# Patient Record
Sex: MALE | Race: WHITE
[De-identification: names, ages, dates, MRNs, and addresses within clinical notes are randomized per-mention and may not be internally consistent; named-entity substitution may affect disease eponyms.]

---

## 2019-02-11 ENCOUNTER — HOSPITAL ENCOUNTER (EMERGENCY)
Dept: HOSPITAL 47 - EC | Age: 34
Discharge: HOME | End: 2019-02-11
Payer: COMMERCIAL

## 2019-02-11 VITALS
TEMPERATURE: 98.4 F | DIASTOLIC BLOOD PRESSURE: 84 MMHG | RESPIRATION RATE: 18 BRPM | HEART RATE: 73 BPM | SYSTOLIC BLOOD PRESSURE: 127 MMHG

## 2019-02-11 DIAGNOSIS — M79.604: ICD-10-CM

## 2019-02-11 DIAGNOSIS — R20.2: ICD-10-CM

## 2019-02-11 DIAGNOSIS — Z96.698: ICD-10-CM

## 2019-02-11 DIAGNOSIS — R20.0: ICD-10-CM

## 2019-02-11 DIAGNOSIS — Z87.81: ICD-10-CM

## 2019-02-11 DIAGNOSIS — F17.200: ICD-10-CM

## 2019-02-11 DIAGNOSIS — G89.18: Primary | ICD-10-CM

## 2019-02-11 DIAGNOSIS — Z79.899: ICD-10-CM

## 2019-02-11 DIAGNOSIS — Z79.82: ICD-10-CM

## 2019-02-11 DIAGNOSIS — M79.89: ICD-10-CM

## 2019-02-11 PROCEDURE — 99284 EMERGENCY DEPT VISIT MOD MDM: CPT
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
## 2019-02-11 NOTE — ED
Extremity Problem HPI





- General


Chief complaint: Extremity Problem,Nontraumatic


Stated complaint: R Leg Pain, Numbness


Time Seen by Provider: 02/11/19 19:02


Source: patient


Mode of arrival: EMS


Limitations: no limitations





- History of Present Illness


Initial comments: 


33-year-old male patient who recently underwent external fixation of a right 

ankle injury on 02/05/2019.  Patient states that he is out of his pain 

medication as been having increasing pain to the right leg.  Patient states he 

is also having a numbness tingling sensation in the right lateral thigh that is 

worsened since yesterday.  States his leg is swollen.  States his surgeon is 

concern for blood clot.  States he is taking an aspirin daily.  Denies any 

numbness or tingling to the foot.  Denies any new injury to the leg.  Denies 

any chest pain, shortness of breath, or palpitations.  Denies any history of 

DVT. Patient denies any recent rash, fever, chills, abdominal pain, nausea, 

vomiting, diarrhea, constipation, back pain, numbness, tingling, dizziness, 

weakness, hematuria, dysuria, urinary urgency, urinary frequency, headache, 

visual changes, or any other complaints.








- Related Data


 Home Medications











 Medication  Instructions  Recorded  Confirmed


 


Aspirin EC [Ecotrin Low Dose] 81 mg PO DAILY 02/11/19 02/11/19


 


Docusate [Colace] 100 mg PO DAILY 02/11/19 02/11/19


 


HYDROcodone/APAP 5-325MG [Norco 1 tab PO Q6HR PRN 02/11/19 02/11/19





5-325]   


 


Omeprazole 40 mg PO DAILY 02/11/19 02/11/19











 Allergies











Allergy/AdvReac Type Severity Reaction Status Date / Time


 


No Known Allergies Allergy   Unverified 02/11/19 19:21














Review of Systems


ROS Statement: 


Those systems with pertinent positive or pertinent negative responses have been 

documented in the HPI.





ROS Other: All systems not noted in ROS Statement are negative.





Past Medical History


Additional Past Medical History / Comment(s): suicide attempts in past


History of Any Multi-Drug Resistant Organisms: None Reported


Past Surgical History: Orthopedic Surgery


Additional Past Surgical History / Comment(s): r ankle surg


Past Psychological History: Anxiety, Bipolar, Depression


Smoking Status: Current every day smoker


Past Alcohol Use History: Abuse, Daily


Past Drug Use History: Marijuana





General Exam


Limitations: no limitations


General appearance: alert, in no apparent distress, other (Physical well-

developed, well-nourished adult male patient in no acute distress.  Vital signs 

upon presentation are temperature 98.4F, pulse 73, respirations 18, blood 

pressure 127/84, pulse ox 99% on room air.)


Eye exam: Present: normal appearance, PERRL, EOMI.  Absent: scleral icterus, 

conjunctival injection, periorbital swelling


ENT exam: Present: normal exam, normal oropharynx, mucous membranes moist


Respiratory exam: Present: normal lung sounds bilaterally.  Absent: respiratory 

distress, wheezes, rales, rhonchi, stridor


Cardiovascular Exam: Present: regular rate, normal rhythm, normal heart sounds.

  Absent: systolic murmur, diastolic murmur, rubs, gallop, clicks


Extremities exam: Present: full ROM, normal capillary refill, other (Patient 

has external fixation device to the right lower leg and ankle.  Patient 

insertion sites appear to be intact with no evidence of infection.  There is 

mild swelling of the right leg, nonpitting.  Skin is pink, warm, dry.  Cap 

refills less than 3 seconds.  Pedal pulses 2+.).  Absent: normal inspection, 

tenderness, pedal edema, joint swelling, calf tenderness


Neurological exam: Present: alert, oriented X3, CN II-XII intact


Psychiatric exam: Present: normal affect, normal mood


Skin exam: Present: warm, dry, intact, normal color.  Absent: rash





Course


 Vital Signs











  02/11/19





  18:58


 


Temperature 98.4 F


 


Pulse Rate 73


 


Respiratory 18





Rate 


 


Blood Pressure 127/84


 


O2 Sat by Pulse 99





Oximetry 














Medical Decision Making





- Medical Decision Making


33-year-old male patient presents to the emergency department today for 

evaluation of pain, swelling, and paresthesia to the right lower extremity.  

Patient is status post external fixation and repair of right ankle fracture on 

02/05/2019.  Patient's physicians abdomen to rule out DVT.  Ultrasound was 

obtained and showed no evidence for acute DVT.  Patient is neurovascular status 

was intact.  Pulses were strong and intact.  This all patient's pain is more 

related to being out of his narcotic pain medication as he did take more than 

directed over the last several days.  He is instructed to follow-up with his 

surgeon for further evaluation any further prescription refills.  Return 

parameters were discussed in detail.  He verbalizes understanding and agrees 

with this plan.








- Radiology Data


Radiology results: report reviewed, image reviewed


Venous Doppler duplex of the right lower extremity was obtained.  Report was 

reviewed in its entirety.  Impression by Dr. Wild shows no ultrasound evidence 

for acute DVT in the right lower extremity.





Disposition


Clinical Impression: 


 Postoperative pain, Right leg paresthesias





Disposition: HOME SELF-CARE


Condition: Good


Instructions (If sedation given, give patient instructions):  Paresthesia (ED), 

Leg Pain (ED)


Additional Instructions: 


Follow-up with your surgeon for further evaluation and any refills on 

prescriptions.  Return to the emergency department for any new, worsening, or 

concerning symptoms.


Is patient prescribed a controlled substance at d/c from ED?: No


Referrals: 


None,Stated [Primary Care Provider] - 1-2 days


Time of Disposition: 20:46

## 2019-02-16 ENCOUNTER — HOSPITAL ENCOUNTER (EMERGENCY)
Dept: HOSPITAL 47 - EC | Age: 34
Discharge: HOME | End: 2019-02-16
Payer: COMMERCIAL

## 2019-02-16 VITALS — SYSTOLIC BLOOD PRESSURE: 135 MMHG | TEMPERATURE: 98.5 F | DIASTOLIC BLOOD PRESSURE: 72 MMHG | HEART RATE: 99 BPM

## 2019-02-16 VITALS — RESPIRATION RATE: 18 BRPM

## 2019-02-16 DIAGNOSIS — Z79.899: ICD-10-CM

## 2019-02-16 DIAGNOSIS — Z53.8: ICD-10-CM

## 2019-02-16 DIAGNOSIS — S82.891A: Primary | ICD-10-CM

## 2019-02-16 DIAGNOSIS — F17.200: ICD-10-CM
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
## 2019-02-16 DIAGNOSIS — G89.18: ICD-10-CM

## 2019-02-16 DIAGNOSIS — Z79.82: ICD-10-CM

## 2019-02-16 DIAGNOSIS — Z98.890: ICD-10-CM

## 2019-02-16 PROCEDURE — 73590 X-RAY EXAM OF LOWER LEG: CPT

## 2019-02-16 PROCEDURE — 99283 EMERGENCY DEPT VISIT LOW MDM: CPT

## 2019-02-16 PROCEDURE — 96372 THER/PROPH/DIAG INJ SC/IM: CPT

## 2019-02-16 NOTE — XR
EXAMINATION TYPE: XR tibia fibula RT

 

DATE OF EXAM: 2/16/2019

 

CLINICAL HISTORY: pain

 

TECHNIQUE:  AP and lateral images of the right tibia and fibula are obtained.

 

COMPARISON: None.

 

FINDINGS: Comminuted distal tibial fracture. Comminuted ulnar fracture noted as well. Traction device
 in place. The joint spaces  appear within normal limits. Soft tissue edema noted. The graft IMPRESSI
ON:  

 

Distal tibial and fibular fractures with traction device in place.
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
## 2019-02-17 ENCOUNTER — HOSPITAL ENCOUNTER (EMERGENCY)
Dept: HOSPITAL 47 - EC | Age: 34
Discharge: HOME | End: 2019-02-17
Payer: COMMERCIAL

## 2019-02-17 VITALS
DIASTOLIC BLOOD PRESSURE: 80 MMHG | SYSTOLIC BLOOD PRESSURE: 128 MMHG | RESPIRATION RATE: 18 BRPM | HEART RATE: 83 BPM | TEMPERATURE: 97.7 F

## 2019-02-17 DIAGNOSIS — S82.831A: ICD-10-CM

## 2019-02-17 DIAGNOSIS — R00.0: ICD-10-CM

## 2019-02-17 DIAGNOSIS — R06.82: ICD-10-CM

## 2019-02-17 DIAGNOSIS — Z79.899: ICD-10-CM

## 2019-02-17 DIAGNOSIS — Z59.0: ICD-10-CM

## 2019-02-17 DIAGNOSIS — F17.200: ICD-10-CM

## 2019-02-17 DIAGNOSIS — G89.18: ICD-10-CM

## 2019-02-17 DIAGNOSIS — S82.391A: Primary | ICD-10-CM

## 2019-02-17 DIAGNOSIS — F11.20: ICD-10-CM

## 2019-02-17 DIAGNOSIS — Z91.14: ICD-10-CM

## 2019-02-17 DIAGNOSIS — Z79.82: ICD-10-CM

## 2019-02-17 DIAGNOSIS — R50.9: ICD-10-CM

## 2019-02-17 DIAGNOSIS — R45.851: ICD-10-CM
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
## 2019-02-17 LAB
ALBUMIN SERPL-MCNC: 3.4 G/DL (ref 3.5–5)
ALP SERPL-CCNC: 77 U/L (ref 38–126)
ALT SERPL-CCNC: 33 U/L (ref 21–72)
ANION GAP SERPL CALC-SCNC: 5 MMOL/L
APTT BLD: 22.6 SEC (ref 22–30)
AST SERPL-CCNC: 29 U/L (ref 17–59)
BASOPHILS # BLD AUTO: 0.1 K/UL (ref 0–0.2)
BASOPHILS NFR BLD AUTO: 0 %
BUN SERPL-SCNC: 15 MG/DL (ref 9–20)
CALCIUM SPEC-MCNC: 8.8 MG/DL (ref 8.4–10.2)
CHLORIDE SERPL-SCNC: 109 MMOL/L (ref 98–107)
CO2 SERPL-SCNC: 22 MMOL/L (ref 22–30)
EOSINOPHIL # BLD AUTO: 0.3 K/UL (ref 0–0.7)
EOSINOPHIL NFR BLD AUTO: 2 %
ERYTHROCYTE [DISTWIDTH] IN BLOOD BY AUTOMATED COUNT: 3.82 M/UL (ref 4.3–5.9)
ERYTHROCYTE [DISTWIDTH] IN BLOOD: 14.6 % (ref 11.5–15.5)
GLUCOSE SERPL-MCNC: 108 MG/DL (ref 74–99)
HCT VFR BLD AUTO: 34.2 % (ref 39–53)
HGB BLD-MCNC: 11.9 GM/DL (ref 13–17.5)
INR PPP: 1 (ref ?–1.2)
LYMPHOCYTES # SPEC AUTO: 1.1 K/UL (ref 1–4.8)
LYMPHOCYTES NFR SPEC AUTO: 8 %
MCH RBC QN AUTO: 31.1 PG (ref 25–35)
MCHC RBC AUTO-ENTMCNC: 34.6 G/DL (ref 31–37)
MCV RBC AUTO: 89.7 FL (ref 80–100)
MONOCYTES # BLD AUTO: 0.9 K/UL (ref 0–1)
MONOCYTES NFR BLD AUTO: 7 %
NEUTROPHILS # BLD AUTO: 10.8 K/UL (ref 1.3–7.7)
NEUTROPHILS NFR BLD AUTO: 81 %
PH UR: 5.5 [PH] (ref 5–8)
PLATELET # BLD AUTO: 364 K/UL (ref 150–450)
POTASSIUM SERPL-SCNC: 4 MMOL/L (ref 3.5–5.1)
PROT SERPL-MCNC: 6.2 G/DL (ref 6.3–8.2)
PT BLD: 10.4 SEC (ref 9–12)
SODIUM SERPL-SCNC: 136 MMOL/L (ref 137–145)
SP GR UR: 1.01 (ref 1–1.03)
UROBILINOGEN UR QL STRIP: <2 MG/DL (ref ?–2)
WBC # BLD AUTO: 13.3 K/UL (ref 3.8–10.6)

## 2019-02-17 PROCEDURE — 96375 TX/PRO/DX INJ NEW DRUG ADDON: CPT

## 2019-02-17 PROCEDURE — 85730 THROMBOPLASTIN TIME PARTIAL: CPT

## 2019-02-17 PROCEDURE — 99284 EMERGENCY DEPT VISIT MOD MDM: CPT

## 2019-02-17 PROCEDURE — 93005 ELECTROCARDIOGRAM TRACING: CPT

## 2019-02-17 PROCEDURE — 80053 COMPREHEN METABOLIC PANEL: CPT

## 2019-02-17 PROCEDURE — 36415 COLL VENOUS BLD VENIPUNCTURE: CPT

## 2019-02-17 PROCEDURE — 71046 X-RAY EXAM CHEST 2 VIEWS: CPT

## 2019-02-17 PROCEDURE — 96365 THER/PROPH/DIAG IV INF INIT: CPT

## 2019-02-17 PROCEDURE — 96361 HYDRATE IV INFUSION ADD-ON: CPT

## 2019-02-17 PROCEDURE — 73590 X-RAY EXAM OF LOWER LEG: CPT

## 2019-02-17 PROCEDURE — 85025 COMPLETE CBC W/AUTO DIFF WBC: CPT

## 2019-02-17 PROCEDURE — 87040 BLOOD CULTURE FOR BACTERIA: CPT

## 2019-02-17 PROCEDURE — 80306 DRUG TEST PRSMV INSTRMNT: CPT

## 2019-02-17 PROCEDURE — 83605 ASSAY OF LACTIC ACID: CPT

## 2019-02-17 PROCEDURE — 81003 URINALYSIS AUTO W/O SCOPE: CPT

## 2019-02-17 PROCEDURE — 86140 C-REACTIVE PROTEIN: CPT

## 2019-02-17 PROCEDURE — 87502 INFLUENZA DNA AMP PROBE: CPT

## 2019-02-17 PROCEDURE — 85610 PROTHROMBIN TIME: CPT

## 2019-02-17 RX ADMIN — NICARDIPINE HYDROCHLORIDE SCH MLS/HR: 2.5 INJECTION INTRAVENOUS at 07:23

## 2019-02-17 RX ADMIN — NICARDIPINE HYDROCHLORIDE SCH MLS/HR: 2.5 INJECTION INTRAVENOUS at 05:04

## 2019-02-17 SDOH — ECONOMIC STABILITY - HOUSING INSECURITY: HOMELESSNESS: Z59.0
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
## 2019-02-17 NOTE — XR
EXAM:

  XR Right Tibia and Fibula, 2 Views

 

CLINICAL HISTORY:

  ITS.REASON XR Reason: Pain

 

TECHNIQUE:

  Frontal and lateral views of the right tibia and fibula.

 

COMPARISON:

  Right tibia fibula radiographs 2/16/2019.

 

FINDINGS:

  Bones/joints:  Comminuted intra-articular distal tibia and fibula 

fractures are seen status post external fixation of the right lower leg.  

No significant interval change in alignment of fractures.

  Soft tissues: Soft tissue swelling of the ankle..  No radiopaque 

foreign body.

 

IMPRESSION:     

  Comminuted intra-articular distal tibia and fibula fractures are seen 

status post external fixation of the right lower leg.  No new fracture is 

seen.

## 2019-02-19 ENCOUNTER — HOSPITAL ENCOUNTER (EMERGENCY)
Dept: HOSPITAL 47 - EC | Age: 34
Discharge: HOME | End: 2019-02-19
Payer: COMMERCIAL

## 2019-02-19 VITALS
DIASTOLIC BLOOD PRESSURE: 60 MMHG | HEART RATE: 102 BPM | TEMPERATURE: 101.9 F | RESPIRATION RATE: 18 BRPM | SYSTOLIC BLOOD PRESSURE: 132 MMHG

## 2019-02-19 DIAGNOSIS — R50.9: ICD-10-CM

## 2019-02-19 DIAGNOSIS — F17.200: ICD-10-CM

## 2019-02-19 DIAGNOSIS — Z00.00: Primary | ICD-10-CM

## 2019-02-19 LAB
ANION GAP SERPL CALC-SCNC: 8 MMOL/L
BASOPHILS # BLD AUTO: 0.1 K/UL (ref 0–0.2)
BASOPHILS NFR BLD AUTO: 1 %
BUN SERPL-SCNC: 8 MG/DL (ref 9–20)
CALCIUM SPEC-MCNC: 9 MG/DL (ref 8.4–10.2)
CHLORIDE SERPL-SCNC: 108 MMOL/L (ref 98–107)
CO2 SERPL-SCNC: 25 MMOL/L (ref 22–30)
EOSINOPHIL # BLD AUTO: 0.2 K/UL (ref 0–0.7)
EOSINOPHIL NFR BLD AUTO: 3 %
ERYTHROCYTE [DISTWIDTH] IN BLOOD BY AUTOMATED COUNT: 4.03 M/UL (ref 4.3–5.9)
ERYTHROCYTE [DISTWIDTH] IN BLOOD: 14.4 % (ref 11.5–15.5)
GLUCOSE SERPL-MCNC: 88 MG/DL (ref 74–99)
HCT VFR BLD AUTO: 36.6 % (ref 39–53)
HGB BLD-MCNC: 12.4 GM/DL (ref 13–17.5)
LYMPHOCYTES # SPEC AUTO: 0.7 K/UL (ref 1–4.8)
LYMPHOCYTES NFR SPEC AUTO: 9 %
MCH RBC QN AUTO: 30.8 PG (ref 25–35)
MCHC RBC AUTO-ENTMCNC: 33.9 G/DL (ref 31–37)
MCV RBC AUTO: 90.7 FL (ref 80–100)
MONOCYTES # BLD AUTO: 0.6 K/UL (ref 0–1)
MONOCYTES NFR BLD AUTO: 7 %
NEUTROPHILS # BLD AUTO: 6.1 K/UL (ref 1.3–7.7)
NEUTROPHILS NFR BLD AUTO: 78 %
PLATELET # BLD AUTO: 425 K/UL (ref 150–450)
POTASSIUM SERPL-SCNC: 4.3 MMOL/L (ref 3.5–5.1)
SODIUM SERPL-SCNC: 141 MMOL/L (ref 137–145)
WBC # BLD AUTO: 7.8 K/UL (ref 3.8–10.6)

## 2019-02-19 PROCEDURE — 85025 COMPLETE CBC W/AUTO DIFF WBC: CPT

## 2019-02-19 PROCEDURE — 80048 BASIC METABOLIC PNL TOTAL CA: CPT

## 2019-02-19 PROCEDURE — 36415 COLL VENOUS BLD VENIPUNCTURE: CPT

## 2019-02-19 PROCEDURE — 99283 EMERGENCY DEPT VISIT LOW MDM: CPT
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
## 2019-02-19 NOTE — XR
PROCEDURE: XR tibia fibula RT - 3V

DATE AND TIME: 2/19/2019 6:27 PM

 

CLINICAL INDICATION: PHH; Pain, increased pain and redness

 

TECHNIQUE: AP, oblique AP, and lateral views

 

COMPARISON: 2/17/2019

 

FINDINGS: The comminuted tibial plafond intra-articular fracture is noted, as is the calcaneal fractu
re. 

 

No other fractures. 

 

The soft tissues are unremarkable.

 

IMPRESSION:

No definite interval change.

## 2019-02-19 NOTE — XR
PROCEDURE: XR foot complete RT - 3V

DATE AND TIME: 2/19/2019 6:27 PM

 

CLINICAL INDICATION: PHH; Pain

 

TECHNIQUE: Crosstable lateral and AP and oblique AP views

 

COMPARISON: 2/17/2019

 

FINDINGS: External fixator in place. The comminuted tibial plafond intra-articular fracture is noted,
 as is the calcaneal fracture. 

 

There is no midfoot or forefoot fracture or malalignment. 

 

IMPRESSION:

Stable appearance.

## 2019-03-19 ENCOUNTER — HOSPITAL ENCOUNTER (EMERGENCY)
Dept: HOSPITAL 47 - EC | Age: 34
LOS: 1 days | Discharge: HOME | End: 2019-03-20
Payer: COMMERCIAL

## 2019-03-19 VITALS
RESPIRATION RATE: 18 BRPM | DIASTOLIC BLOOD PRESSURE: 99 MMHG | TEMPERATURE: 98.1 F | SYSTOLIC BLOOD PRESSURE: 147 MMHG | HEART RATE: 99 BPM

## 2019-03-19 DIAGNOSIS — F17.200: ICD-10-CM

## 2019-03-19 DIAGNOSIS — Z98.890: ICD-10-CM

## 2019-03-19 DIAGNOSIS — M96.89: Primary | ICD-10-CM

## 2019-03-19 PROCEDURE — 99282 EMERGENCY DEPT VISIT SF MDM: CPT

## 2019-03-19 NOTE — ED
General Adult HPI





- General


Source: patient


Mode of arrival: ambulatory


Limitations: no limitations





<Netta Gibson - Last Filed: 03/19/19 23:38>





<Shikha Mcleod - Last Filed: 03/20/19 21:56>





- General


Chief complaint: Recheck/Abnormal Lab/Rx


Stated complaint: Recurring Infection in R Tibia


Time Seen by Provider: 03/19/19 23:10





- History of Present Illness


Initial comments: 


33-year-old male patient with history of right ankle fracture with placement of 

external fixator device on 02/06/2019 presents to the emergency department today

for evaluation of drainage from his pin sites.  Patient states for the last 

couple days he has had increased yellow drainage from the sites near his heel.  

Denies any increased pain, swelling, redness, or fever.  States he did recently 

complete a prescription of Cleocin for infection.  He states he does have an 

appointment on Friday to have the external fixator removed.  He denies any new 

injury to the ankle. Patient denies any recent rash, shortness breath, chest 

pain, abdominal pain, nausea, vomiting, diarrhea, constipation, back pain, 

numbness, tingling, dizziness, weakness, hematuria, dysuria, urinary urgency, 

urinary frequency, headache, visual changes, or any other complaints.


 (Netta Gibson)





- Related Data


                                Home Medications











 Medication  Instructions  Recorded  Confirmed


 


No Known Home Medications  02/17/19 02/19/19











                                    Allergies











Allergy/AdvReac Type Severity Reaction Status Date / Time


 


No Known Allergies Allergy   Verified 03/19/19 23:06














Review of Systems


ROS Other: All systems not noted in ROS Statement are negative.





<Netta Gibson - Last Filed: 03/19/19 23:38>


ROS Other: All systems not noted in ROS Statement are negative.





<Shikha Mcleod - Last Filed: 03/20/19 21:56>


ROS Statement: 


Those systems with pertinent positive or pertinent negative responses have been 

documented in the HPI.








Past Medical History


Additional Past Medical History / Comment(s): suicide attempts in past


History of Any Multi-Drug Resistant Organisms: None Reported


Past Surgical History: Orthopedic Surgery


Additional Past Surgical History / Comment(s): r ankle surg


Past Psychological History: Anxiety, Bipolar, Depression


Smoking Status: Current every day smoker


Past Alcohol Use History: Abuse, Daily


Past Drug Use History: Marijuana





<Netta Gibson - Last Filed: 03/19/19 23:38>





General Exam


Limitations: no limitations


General appearance: alert, in no apparent distress, other (Physical well-

developed, well-nourished adult male patient in no acute distress.  Vital signs 

upon presentation after 98.1F, pulse 99, respirations 18, blood pressure 

147/99, pulse ox 98% on room air)


Eye exam: Present: normal appearance, PERRL, EOMI.  Absent: scleral icterus, 

conjunctival injection, periorbital swelling


Respiratory exam: Present: normal lung sounds bilaterally.  Absent: respiratory 

distress, wheezes, rales, rhonchi, stridor


Cardiovascular Exam: Present: regular rate, normal rhythm, normal heart sounds. 

Absent: systolic murmur, diastolic murmur, rubs, gallop, clicks


Extremities exam: Present: normal capillary refill, other (Patient has external 

fixator applied to the right ankle.  Patient has multiple pins, there is clear 

yellow drainage noted from the pins inserted to the bilateral heel region.  No 

evidence of purulent drainage.  No surrounding erythema or swelling.  Pedal 

pulses 2+ and equal bilaterally.).  Absent: normal inspection, full ROM 

(External fixator applied to the right ankle), tenderness, pedal edema, joint 

swelling, calf tenderness


Neurological exam: Present: alert, oriented X3, CN II-XII intact


Psychiatric exam: Present: normal affect, normal mood


Skin exam: Present: warm, dry, intact, normal color.  Absent: rash





<Netta Gibson M - Last Filed: 03/19/19 23:38>





Course





                                   Vital Signs











  03/19/19





  23:03


 


Temperature 98.1 F


 


Pulse Rate 99


 


Respiratory 18





Rate 


 


Blood Pressure 147/99


 


O2 Sat by Pulse 98





Oximetry 














Medical Decision Making





<Netta Gibson M - Last Filed: 03/19/19 23:38>





<Shikha Mcleod P - Last Filed: 03/20/19 21:56>





- Medical Decision Making


33-year-old male patient presents to the emergency department today for 

evaluation of drainage from pinhole sites on his external fixator.  Physical 

examination does reveal serous drainage from the pins placed nearest his heel.  

There is no surrounding erythema or swelling.  Patient is afebrile vital signs 

are stable.  Patient did recently complete Cleocin after having an infection.  

This appears to be physiologic drainage at this time.  We will clean wounds and 

reapply dressings.  He doesn't appointment with his orthopedic doctor on Friday.

 He is urged to keep this appointment.  Return parameters were discussed in 

detail.  He verbalizes understanding and agrees this plan.


 (Netta Gibson)


I was available for consultation in the emergency department. The history and 

physical exam were done by the midlevel provider.  I was consulted for this 

patient's care.  I reviewed the case with the midlevel provider and based on 

their presentation of the patient, I agree with the assessment, medical decision

making and plan of care as documented. (hSikha Mcleod)





Disposition


Is patient prescribed a controlled substance at d/c from ED?: No


Time of Disposition: 23:24





<Netta Gibson - Last Filed: 03/19/19 23:38>





<Shikha Mcleod - Last Filed: 03/20/19 21:56>


Clinical Impression: 


 Increased wound drainage





Disposition: HOME SELF-CARE


Condition: Good


Instructions (If sedation given, give patient instructions):  Acute Wound Care 

(ED)


Additional Instructions: 


Keep wounds clean and dry.  Monitor for redness and swelling.  Monitor for 

drainage of pus.  Follow-up with your physician as you have planned on Friday.  

Return to the emergency department immediately for any new, worsening, or 

concerning symptoms.


Referrals: 


None,Stated [Primary Care Provider] - 1-2 days

## 2019-03-24 ENCOUNTER — HOSPITAL ENCOUNTER (EMERGENCY)
Dept: HOSPITAL 47 - EC | Age: 34
LOS: 1 days | Discharge: HOME | End: 2019-03-25
Payer: COMMERCIAL
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
## 2019-03-24 DIAGNOSIS — M79.671: Primary | ICD-10-CM
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
## 2019-03-24 DIAGNOSIS — Z98.890: ICD-10-CM

## 2019-03-24 DIAGNOSIS — F17.200: ICD-10-CM

## 2019-03-24 DIAGNOSIS — G89.18: ICD-10-CM
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
## 2019-03-24 PROCEDURE — 99284 EMERGENCY DEPT VISIT MOD MDM: CPT

## 2019-03-25 VITALS — TEMPERATURE: 98.5 F | SYSTOLIC BLOOD PRESSURE: 129 MMHG | DIASTOLIC BLOOD PRESSURE: 80 MMHG | HEART RATE: 99 BPM

## 2019-03-25 VITALS — RESPIRATION RATE: 16 BRPM

## 2019-03-25 NOTE — XR
EXAM:

  XR Right Foot Complete, 3 or More Views

 

CLINICAL HISTORY:

  Pain

 

TECHNIQUE:

  Frontal, lateral and oblique views of the right foot.

 

COMPARISON:

  2/17/2019

 

FINDINGS:

  Limitations:  The examination is limited by external overlying hardware.

 

  Bones/joints:  An external fixator traverses the calcaneus.  A severely 

comminuted fracture involving the distal tibia and fibula are partially 

evaluated.  No definite acute traumatic injury or interval changes noted 

involving the tarsal bones, metatarsals and phalanges.

  Soft tissues:  There is soft tissue swelling of the midfoot which is 

new from previous exam.

 

IMPRESSION: External fixator hardware noted traversing the calcaneus.  

Comminuted fractures involving the distal tibia and fibula, partially 

visualized.

 

Soft tissue swelling of the midfoot is new from previous examination.  No 

osseous abnormality or other significant interval change involving the 

right foot.
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
## 2019-03-25 NOTE — XR
EXAM:

  XR Right Ankle Complete, 3 or More Views

 

CLINICAL HISTORY:

  Pain

 

TECHNIQUE:

  Frontal, lateral and oblique views of the right ankle.

 

COMPARISON:

  3/17/2019

 

FINDINGS:

  Bones/joints:  External fixators are noted involving the tibia and the 

calcaneus.  Severely comminuted fracture involving the distal tibial 

metaphysis with intra-articular extension is noted.  No significant 

alteration in alignment is identified.  There is comminuted fractures 

involving the lateral malleolus.  There is combination posterior to the 

talus with questionable defect along the posterior dome of the talus.

  Soft tissues: Soft tissue swelling at the ankle is slightly increased 

from previous examination, particularly in the region of the lateral 

malleolus.

 

IMPRESSION: External fixators remain.  Severely comminuted fracture 

involving the tibia, distal fibula and probably the posterior dome of the 

talus.  Overlying soft tissue swelling at the ankle is slightly prominent 

from previous examination, particularly laterally.

## 2019-03-25 NOTE — XR
EXAM:

  XR Right Tibia and Fibula, 2 Views

 

CLINICAL HISTORY:

  Pain

 

TECHNIQUE:

  Frontal and lateral views of the right tibia and fibula.

 

COMPARISON:

  2/16/2019

 

FINDINGS:

  Bones/joints:  External hardware is noted traversing the calcaneus.  2 

external fixators are noted in the proximal to mid diaphysis of the tibia 

in an anterior posterior direction.  Severely comminuted fracture 

involving the distal right tibial metaphysis with intra-articular 

extension is similar to previous examination without significant 

alteration, accounting for positioning.  Comminution involving the 

posterior ankle joint is noted.  There is questionable involvement of the 

posterior talus.  There is comminuted fracture involving the distal 

fibula.  No dislocation.

  Soft tissues: Soft tissue swelling noted at the ankle, possibly 

slightly more prominent from previous exam.

 

IMPRESSION: External fixators as noted above.  Comminuted fractures 

involving the distal tibia and fibula with intra-articular extension and 

potential involvement of the talus are similar to previous exam without 

significant alteration.  Soft tissue swelling at the ankle is 

questionably slightly increased.

## 2019-03-26 ENCOUNTER — HOSPITAL ENCOUNTER (EMERGENCY)
Dept: HOSPITAL 47 - EC | Age: 34
LOS: 1 days | Discharge: PSYCHIATRIC OR SUBSTANCE USE FACILITY | End: 2019-03-27
Payer: COMMERCIAL

## 2019-03-26 DIAGNOSIS — F32.9: Primary | ICD-10-CM

## 2019-03-26 DIAGNOSIS — F17.200: ICD-10-CM

## 2019-03-26 DIAGNOSIS — R45.851: ICD-10-CM

## 2019-03-26 LAB
ALBUMIN SERPL-MCNC: 4.1 G/DL (ref 3.5–5)
ALP SERPL-CCNC: 84 U/L (ref 38–126)
ALT SERPL-CCNC: 21 U/L (ref 21–72)
ANION GAP SERPL CALC-SCNC: 13 MMOL/L
AST SERPL-CCNC: 27 U/L (ref 17–59)
BASOPHILS # BLD AUTO: 0.1 K/UL (ref 0–0.2)
BASOPHILS NFR BLD AUTO: 1 %
BUN SERPL-SCNC: 12 MG/DL (ref 9–20)
CALCIUM SPEC-MCNC: 9.7 MG/DL (ref 8.4–10.2)
CHLORIDE SERPL-SCNC: 104 MMOL/L (ref 98–107)
CO2 SERPL-SCNC: 24 MMOL/L (ref 22–30)
EOSINOPHIL # BLD AUTO: 0.3 K/UL (ref 0–0.7)
EOSINOPHIL NFR BLD AUTO: 3 %
ERYTHROCYTE [DISTWIDTH] IN BLOOD BY AUTOMATED COUNT: 4.32 M/UL (ref 4.3–5.9)
ERYTHROCYTE [DISTWIDTH] IN BLOOD: 15.3 % (ref 11.5–15.5)
GLUCOSE SERPL-MCNC: 89 MG/DL (ref 74–99)
HCT VFR BLD AUTO: 39.5 % (ref 39–53)
HGB BLD-MCNC: 12.9 GM/DL (ref 13–17.5)
LYMPHOCYTES # SPEC AUTO: 3.2 K/UL (ref 1–4.8)
LYMPHOCYTES NFR SPEC AUTO: 34 %
MCH RBC QN AUTO: 29.9 PG (ref 25–35)
MCHC RBC AUTO-ENTMCNC: 32.8 G/DL (ref 31–37)
MCV RBC AUTO: 91.3 FL (ref 80–100)
MONOCYTES # BLD AUTO: 0.5 K/UL (ref 0–1)
MONOCYTES NFR BLD AUTO: 5 %
NEUTROPHILS # BLD AUTO: 5.2 K/UL (ref 1.3–7.7)
NEUTROPHILS NFR BLD AUTO: 55 %
PLATELET # BLD AUTO: 383 K/UL (ref 150–450)
POTASSIUM SERPL-SCNC: 4.4 MMOL/L (ref 3.5–5.1)
PROT SERPL-MCNC: 7 G/DL (ref 6.3–8.2)
SODIUM SERPL-SCNC: 141 MMOL/L (ref 137–145)
WBC # BLD AUTO: 9.4 K/UL (ref 3.8–10.6)

## 2019-03-26 PROCEDURE — 80306 DRUG TEST PRSMV INSTRMNT: CPT

## 2019-03-26 PROCEDURE — 73610 X-RAY EXAM OF ANKLE: CPT

## 2019-03-26 PROCEDURE — 85025 COMPLETE CBC W/AUTO DIFF WBC: CPT

## 2019-03-26 PROCEDURE — 80053 COMPREHEN METABOLIC PANEL: CPT

## 2019-03-26 PROCEDURE — 36415 COLL VENOUS BLD VENIPUNCTURE: CPT

## 2019-03-26 PROCEDURE — 99285 EMERGENCY DEPT VISIT HI MDM: CPT
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
## 2019-03-26 NOTE — XR
EXAM:

  XR Right Ankle Complete, 3 or More Views

 

CLINICAL HISTORY:

  ITS.REASON XR Reason: Pain

 

TECHNIQUE:

  Frontal, lateral and oblique views of the right ankle.

 

COMPARISON:

  Right ankle radiography 3/25/19 and 2/16/19.

 

FINDINGS:

  See Impression.

 

IMPRESSION:   

  Incompletely imaged severely comminuted fractures of the distal fibula 

and tibia.

 

  External fixator device traverses the calcaneus.

 

  Still with a large amount of soft tissue swelling about the ankle.

## 2019-03-27 VITALS — DIASTOLIC BLOOD PRESSURE: 96 MMHG | SYSTOLIC BLOOD PRESSURE: 146 MMHG

## 2019-03-27 VITALS — TEMPERATURE: 97.5 F | HEART RATE: 79 BPM

## 2019-03-27 VITALS — RESPIRATION RATE: 14 BRPM

## 2019-03-27 RX ADMIN — HYDROCODONE BITARTRATE AND ACETAMINOPHEN PRN EACH: 7.5; 325 TABLET ORAL at 13:24

## 2019-03-27 RX ADMIN — CLINDAMYCIN HYDROCHLORIDE SCH MG: 150 CAPSULE ORAL at 12:51

## 2019-03-27 RX ADMIN — CLINDAMYCIN HYDROCHLORIDE SCH MG: 150 CAPSULE ORAL at 08:48

## 2019-03-27 RX ADMIN — HYDROCODONE BITARTRATE AND ACETAMINOPHEN PRN EACH: 7.5; 325 TABLET ORAL at 08:51

## 2019-04-07 ENCOUNTER — HOSPITAL ENCOUNTER (EMERGENCY)
Dept: HOSPITAL 47 - EC | Age: 34
LOS: 1 days | Discharge: HOME | End: 2019-04-08
Payer: COMMERCIAL

## 2019-04-07 DIAGNOSIS — Z98.890: ICD-10-CM

## 2019-04-07 DIAGNOSIS — M25.571: Primary | ICD-10-CM

## 2019-04-07 DIAGNOSIS — T81.89XA: ICD-10-CM

## 2019-04-07 DIAGNOSIS — F17.200: ICD-10-CM

## 2019-04-07 PROCEDURE — 99283 EMERGENCY DEPT VISIT LOW MDM: CPT

## 2019-04-08 VITALS
TEMPERATURE: 98.4 F | SYSTOLIC BLOOD PRESSURE: 129 MMHG | HEART RATE: 87 BPM | RESPIRATION RATE: 17 BRPM | DIASTOLIC BLOOD PRESSURE: 91 MMHG

## 2019-04-08 NOTE — XR
EXAM:

  XR Right Ankle Complete, 3 or More Views

 

CLINICAL HISTORY:

  ITS.REASON XR Reason: Pain

 

TECHNIQUE:

  Frontal, lateral and oblique views of the right ankle.

 

COMPARISON:

  3/26/19

 

FINDINGS/IMPRESSION:     

No significant interval change.

Again demonstrated are fractures of the distal tibia and fibula.  

Possible fracture of the talus, as well.  Alignment unchanged.  

Persistent soft tissue swelling.  External fixation device through 

calcaneus again noted.
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
## 2022-07-08 ENCOUNTER — HOSPITAL ENCOUNTER (EMERGENCY)
Dept: HOSPITAL 47 - EC | Age: 37
Discharge: HOME | End: 2022-07-08
Payer: COMMERCIAL

## 2022-07-08 VITALS — RESPIRATION RATE: 18 BRPM

## 2022-07-08 VITALS — HEART RATE: 87 BPM | SYSTOLIC BLOOD PRESSURE: 147 MMHG | TEMPERATURE: 97.9 F | DIASTOLIC BLOOD PRESSURE: 97 MMHG

## 2022-07-08 DIAGNOSIS — R07.89: Primary | ICD-10-CM

## 2022-07-08 DIAGNOSIS — F17.200: ICD-10-CM

## 2022-07-08 LAB
ALBUMIN SERPL-MCNC: 4 G/DL (ref 3.5–5)
ALP SERPL-CCNC: 70 U/L (ref 38–126)
ALT SERPL-CCNC: 15 U/L (ref 4–49)
ANION GAP SERPL CALC-SCNC: 9 MMOL/L
APTT BLD: 23.9 SEC (ref 22–30)
AST SERPL-CCNC: 24 U/L (ref 17–59)
BASOPHILS # BLD AUTO: 0.2 K/UL (ref 0–0.2)
BASOPHILS NFR BLD AUTO: 3 %
BUN SERPL-SCNC: 16 MG/DL (ref 9–20)
CALCIUM SPEC-MCNC: 9 MG/DL (ref 8.4–10.2)
CHLORIDE SERPL-SCNC: 104 MMOL/L (ref 98–107)
CO2 SERPL-SCNC: 23 MMOL/L (ref 22–30)
EOSINOPHIL # BLD AUTO: 0.2 K/UL (ref 0–0.7)
EOSINOPHIL NFR BLD AUTO: 2 %
ERYTHROCYTE [DISTWIDTH] IN BLOOD BY AUTOMATED COUNT: 4.72 M/UL (ref 4.3–5.9)
ERYTHROCYTE [DISTWIDTH] IN BLOOD: 13.3 % (ref 11.5–15.5)
GLUCOSE SERPL-MCNC: 147 MG/DL (ref 74–99)
HCT VFR BLD AUTO: 42.9 % (ref 39–53)
HGB BLD-MCNC: 14.4 GM/DL (ref 13–17.5)
INR PPP: 0.9 (ref ?–1.2)
LYMPHOCYTES # SPEC AUTO: 1.6 K/UL (ref 1–4.8)
LYMPHOCYTES NFR SPEC AUTO: 21 %
MAGNESIUM SPEC-SCNC: 1.8 MG/DL (ref 1.6–2.3)
MCH RBC QN AUTO: 30.5 PG (ref 25–35)
MCHC RBC AUTO-ENTMCNC: 33.5 G/DL (ref 31–37)
MCV RBC AUTO: 91 FL (ref 80–100)
MONOCYTES # BLD AUTO: 0.5 K/UL (ref 0–1)
MONOCYTES NFR BLD AUTO: 7 %
NEUTROPHILS # BLD AUTO: 5.2 K/UL (ref 1.3–7.7)
NEUTROPHILS NFR BLD AUTO: 67 %
PLATELET # BLD AUTO: 310 K/UL (ref 150–450)
POTASSIUM SERPL-SCNC: 4 MMOL/L (ref 3.5–5.1)
PROT SERPL-MCNC: 6.7 G/DL (ref 6.3–8.2)
PT BLD: 9.9 SEC (ref 9–12)
SODIUM SERPL-SCNC: 136 MMOL/L (ref 137–145)
WBC # BLD AUTO: 7.8 K/UL (ref 3.8–10.6)

## 2022-07-08 PROCEDURE — 85379 FIBRIN DEGRADATION QUANT: CPT

## 2022-07-08 PROCEDURE — 84484 ASSAY OF TROPONIN QUANT: CPT

## 2022-07-08 PROCEDURE — 85730 THROMBOPLASTIN TIME PARTIAL: CPT

## 2022-07-08 PROCEDURE — 85610 PROTHROMBIN TIME: CPT

## 2022-07-08 PROCEDURE — 83735 ASSAY OF MAGNESIUM: CPT

## 2022-07-08 PROCEDURE — 85025 COMPLETE CBC W/AUTO DIFF WBC: CPT

## 2022-07-08 PROCEDURE — 71046 X-RAY EXAM CHEST 2 VIEWS: CPT

## 2022-07-08 PROCEDURE — 36415 COLL VENOUS BLD VENIPUNCTURE: CPT

## 2022-07-08 PROCEDURE — 99285 EMERGENCY DEPT VISIT HI MDM: CPT

## 2022-07-08 PROCEDURE — 80053 COMPREHEN METABOLIC PANEL: CPT

## 2022-07-08 NOTE — XR
EXAMINATION TYPE: XR chest 2V

 

DATE OF EXAM: 7/8/2022

 

COMPARISON: 2/17/2019

 

HISTORY: 36-year-old male with chest pain

 

TECHNIQUE:  PA and lateral views

 

FINDINGS:  

Heart normal size. Aorta and pulmonary vasculature are within normal limits. Focal hazy density at th
e lower lungs likely relates to overlying soft tissue when correlating with the lateral view. There a
ppears to be some mild peribronchial cuffing. No consolidation or pleural effusion.

 

 

IMPRESSION:  

Mild peribronchial cuffing could reflect bronchitis or asthma. Clinically correlate. No focal infiltr
ate seen.

## 2022-07-08 NOTE — ED
General Adult HPI





- General


Chief complaint: Chest Pain


Stated complaint: Chest pain


Time Seen by Provider: 07/08/22 15:02


Source: patient, EMS, RN notes reviewed, old records reviewed


Mode of arrival: EMS


Limitations: no limitations





- History of Present Illness


Initial comments: 





6-year-old male presenting for evaluation of chest tightness which is been 

present for the past 24 hours.  No associated dyspnea.  No cough or fever.  No 

prior cardiac history.  Patient is currently at [facility] where he is successfully detoxing from alcohol.  He's been sober for 

about 3 weeks.  Patient denies abdominal pain.  Denies any radiating symptoms.





- Related Data


                                Home Medications











 Medication  Instructions  Recorded  Confirmed


 


Ibuprofen [Motrin] 800 mg PO Q8H PRN 03/26/19 03/26/19








                                  Previous Rx's











 Medication  Instructions  Recorded


 


Cephalexin [Keflex] 500 mg PO Q6HR 5 Days #20 cap 04/08/19











                                    Allergies











Allergy/AdvReac Type Severity Reaction Status Date / Time


 


No Known Allergies Allergy   Verified 07/08/22 15:05














Review of Systems


ROS Statement: 


Those systems with pertinent positive or pertinent negative responses have been 

documented in the HPI.





ROS Other: All systems not noted in ROS Statement are negative.





Past Medical History


Past Medical History: No Reported History


Additional Past Medical History / Comment(s): suicide attempts in past, alcohol 

withdrawl,


History of Any Multi-Drug Resistant Organisms: None Reported


Past Surgical History: Orthopedic Surgery


Additional Past Surgical History / Comment(s): r ankle surg,


Past Psychological History: Anxiety, Bipolar, Depression, Schizoaffective 

Disorder, Schizophrenia


Smoking Status: Current some day smoker


Past Alcohol Use History: Abuse, Daily


Past Drug Use History: Marijuana





General Exam


Limitations: no limitations


General appearance: alert, in no apparent distress


Head exam: Present: atraumatic, normocephalic


Eye exam: Present: normal appearance, PERRL


ENT exam: Present: normal exam


Neck exam: Present: normal inspection.  Absent: tenderness, meningismus


Respiratory exam: Present: normal lung sounds bilaterally.  Absent: respiratory 

distress, wheezes, rales


Cardiovascular Exam: Present: regular rate, normal rhythm


GI/Abdominal exam: Present: soft.  Absent: distended, tenderness, guarding, 

rebound


Extremities exam: Present: normal inspection, normal capillary refill.  Absent: 

calf tenderness


Neurological exam: Present: alert, oriented X3, CN II-XII intact.  Absent: motor

 sensory deficit


Psychiatric exam: Present: normal affect, normal mood


Skin exam: Present: warm, dry, intact, normal color.  Absent: cyanosis, 

diaphoretic





Course


                                   Vital Signs











  07/08/22 07/08/22





  15:00 15:14


 


Temperature 98.7 F 


 


Pulse Rate 89 95


 


Pulse Rate [  95





Cardiac Monitor  





]  


 


Respiratory 18 18





Rate  


 


Blood Pressure 156/82 156/82


 


O2 Sat by Pulse 98 99





Oximetry  














EKG Findings





- EKG Comments:


EKG Findings:: EKG: Sinus rhythm, rate of 89, NV interval 187, QRS duration 96, 

) access, T-wave inversion in leads 3.





Medical Decision Making





- Medical Decision Making





36-year-old male with chest discomfort, sent from [place].  Symptoms have b

een ongoing for the past 24 hours.  Patient is well-appearing with stable 

vitals, no acute distress.  His EKG is sinus rhythm without ST segment 

elevation.  Chest x-ray shows normal cardiac silhouette, some bronchial cuffing,

no focal pneumonia.  Normal CBC, normal white,, negative d-dimer, negative 

troponin.  Patient reassured.





- Lab Data


Result diagrams: 


                                 07/08/22 15:15





                                 07/08/22 15:15


                                   Lab Results











  07/08/22 07/08/22 07/08/22 Range/Units





  15:15 15:15 15:15 


 


WBC  7.8    (3.8-10.6)  k/uL


 


RBC  4.72    (4.30-5.90)  m/uL


 


Hgb  14.4    (13.0-17.5)  gm/dL


 


Hct  42.9    (39.0-53.0)  %


 


MCV  91.0    (80.0-100.0)  fL


 


MCH  30.5    (25.0-35.0)  pg


 


MCHC  33.5    (31.0-37.0)  g/dL


 


RDW  13.3    (11.5-15.5)  %


 


Plt Count  310    (150-450)  k/uL


 


MPV  8.2    


 


Neutrophils %  67    %


 


Lymphocytes %  21    %


 


Monocytes %  7    %


 


Eosinophils %  2    %


 


Basophils %  3    %


 


Neutrophils #  5.2    (1.3-7.7)  k/uL


 


Lymphocytes #  1.6    (1.0-4.8)  k/uL


 


Monocytes #  0.5    (0-1.0)  k/uL


 


Eosinophils #  0.2    (0-0.7)  k/uL


 


Basophils #  0.2    (0-0.2)  k/uL


 


PT   9.9   (9.0-12.0)  sec


 


INR   0.9   (<1.2)  


 


APTT   23.9   (22.0-30.0)  sec


 


D-Dimer   0.43   (<0.60)  mg/L FEU


 


Sodium    136 L  (137-145)  mmol/L


 


Potassium    4.0  (3.5-5.1)  mmol/L


 


Chloride    104  ()  mmol/L


 


Carbon Dioxide    23  (22-30)  mmol/L


 


Anion Gap    9  mmol/L


 


BUN    16  (9-20)  mg/dL


 


Creatinine    1.05  (0.66-1.25)  mg/dL


 


Est GFR (CKD-EPI)AfAm    >90  (>60 ml/min/1.73 sqM)  


 


Est GFR (CKD-EPI)NonAf    >90  (>60 ml/min/1.73 sqM)  


 


Glucose    147 H  (74-99)  mg/dL


 


Calcium    9.0  (8.4-10.2)  mg/dL


 


Magnesium    1.8  (1.6-2.3)  mg/dL


 


Total Bilirubin    0.2  (0.2-1.3)  mg/dL


 


AST    24  (17-59)  U/L


 


ALT    15  (4-49)  U/L


 


Alkaline Phosphatase    70  ()  U/L


 


Troponin I     (0.000-0.034)  ng/mL


 


Total Protein    6.7  (6.3-8.2)  g/dL


 


Albumin    4.0  (3.5-5.0)  g/dL














  07/08/22 Range/Units





  15:15 


 


WBC   (3.8-10.6)  k/uL


 


RBC   (4.30-5.90)  m/uL


 


Hgb   (13.0-17.5)  gm/dL


 


Hct   (39.0-53.0)  %


 


MCV   (80.0-100.0)  fL


 


MCH   (25.0-35.0)  pg


 


MCHC   (31.0-37.0)  g/dL


 


RDW   (11.5-15.5)  %


 


Plt Count   (150-450)  k/uL


 


MPV   


 


Neutrophils %   %


 


Lymphocytes %   %


 


Monocytes %   %


 


Eosinophils %   %


 


Basophils %   %


 


Neutrophils #   (1.3-7.7)  k/uL


 


Lymphocytes #   (1.0-4.8)  k/uL


 


Monocytes #   (0-1.0)  k/uL


 


Eosinophils #   (0-0.7)  k/uL


 


Basophils #   (0-0.2)  k/uL


 


PT   (9.0-12.0)  sec


 


INR   (<1.2)  


 


APTT   (22.0-30.0)  sec


 


D-Dimer   (<0.60)  mg/L FEU


 


Sodium   (137-145)  mmol/L


 


Potassium   (3.5-5.1)  mmol/L


 


Chloride   ()  mmol/L


 


Carbon Dioxide   (22-30)  mmol/L


 


Anion Gap   mmol/L


 


BUN   (9-20)  mg/dL


 


Creatinine   (0.66-1.25)  mg/dL


 


Est GFR (CKD-EPI)AfAm   (>60 ml/min/1.73 sqM)  


 


Est GFR (CKD-EPI)NonAf   (>60 ml/min/1.73 sqM)  


 


Glucose   (74-99)  mg/dL


 


Calcium   (8.4-10.2)  mg/dL


 


Magnesium   (1.6-2.3)  mg/dL


 


Total Bilirubin   (0.2-1.3)  mg/dL


 


AST   (17-59)  U/L


 


ALT   (4-49)  U/L


 


Alkaline Phosphatase   ()  U/L


 


Troponin I  <0.012  (0.000-0.034)  ng/mL


 


Total Protein   (6.3-8.2)  g/dL


 


Albumin   (3.5-5.0)  g/dL














Disposition


Clinical Impression: 


 Chest pain





Disposition: HOME SELF-CARE


Condition: Good


Instructions (If sedation given, give patient instructions):  Chest Pain (ED)


Additional Instructions: 


Please return with worsening or changing symptoms.


Is patient prescribed a controlled substance at d/c from ED?: No


Referrals: 


None,Stated [Primary Care Provider] - 1-2 days


Lasha Lopez MD [REFERRING] - 1-2 days


Time of Disposition: 16:24